# Patient Record
Sex: MALE | Race: WHITE
[De-identification: names, ages, dates, MRNs, and addresses within clinical notes are randomized per-mention and may not be internally consistent; named-entity substitution may affect disease eponyms.]

---

## 2020-02-14 ENCOUNTER — HOSPITAL ENCOUNTER (EMERGENCY)
Dept: HOSPITAL 25 - ED | Age: 20
Discharge: HOME | End: 2020-02-14
Payer: COMMERCIAL

## 2020-02-14 VITALS — DIASTOLIC BLOOD PRESSURE: 77 MMHG | SYSTOLIC BLOOD PRESSURE: 126 MMHG

## 2020-02-14 DIAGNOSIS — R51: Primary | ICD-10-CM

## 2020-02-14 DIAGNOSIS — Z72.0: ICD-10-CM

## 2020-02-14 PROCEDURE — 99282 EMERGENCY DEPT VISIT SF MDM: CPT

## 2020-02-14 PROCEDURE — 70450 CT HEAD/BRAIN W/O DYE: CPT

## 2020-02-14 NOTE — ED
Headache





- HPI Summary


HPI Summary: 


18-year-old male presents with headache today.  He states he was having sex and 

he developed a headache as he was climaxing.  States it was very intense.  Is 

located on the left side of head on the posterior aspect.  States had left side 

neck pain with it.  He admits to some nausea. Denies any change in vision.  no 

photophobia or phonophobia.  Does have a history of migraines.  This does not 

feel like his normal migraines.  He states he had this headache earlier this 

week while he was doing bicep curls.  States it lasted a couple minutes.  He 

went to the chiropractor and was told his neck muscles may be tight.  He states 

that he's never had this before. His headache started about an hour ago.  He 

states is 4 out of 10 intensity.  





- History Of Current Complaint


Chief Complaint: EDHeadache


Stated Complaint: BACK OF HEAD PAIN PER PT


Time Seen by Provider: 02/14/20 19:36





- Allergies/Home Medications


Allergies/Adverse Reactions: 


 Allergies











Allergy/AdvReac Type Severity Reaction Status Date / Time


 


No Known Allergies Allergy   Verified 02/14/20 19:03











Home Medications: 


 Home Medications





NK [No Home Medications Reported]  02/14/20 [History Confirmed 02/14/20]











PMH/Surg Hx/FS Hx/Imm Hx


Endocrine/Hematology History: 


   Denies: Hx Anticoagulant Therapy


Respiratory History: 


   Denies: Hx Asthma


Infectious Disease History: No


Infectious Disease History: 


   Denies: Traveled Outside the [place] in Last 30 Days





- Family History


Known Family History: Positive: Non-Contributory





- Social History


Alcohol Use: Occasionally


Substance Use Type: Reports: Marijuana


Smoking Status (MU): Current Some Day Smoker





Review of Systems


Negative: Fever


Negative: Chest Pain


Negative: Shortness Of Breath


Positive: Headache


All Other Systems Reviewed And Are Negative: Yes





Physical Exam


Triage Information Reviewed: Yes


Vital Signs On Initial Exam: 


 Initial Vitals











Temp Pulse Resp BP Pulse Ox


 


 98.1 F   74   16   152/80   99 


 


 02/14/20 18:58  02/14/20 18:58  02/14/20 18:58  02/14/20 18:58  02/14/20 18:58











Vital Signs Reviewed: Yes


Appearance: Positive: Well-Appearing


Skin: Positive: Warm, Dry


Head/Face: Positive: Normal Head/Face Inspection


Eyes: Positive: Normal, EOMI, GÓMEZ, Conjunctiva Clear


ENT: Positive: Normal ENT inspection, Pharynx normal, TMs normal


Neck: Positive: Other: - tenderness side of left neck, no midline tenderness


Respiratory/Lung Sounds: Positive: Clear to Auscultation, Breath Sounds Present


Cardiovascular: Positive: Normal, RRR


Musculoskeletal: Positive: Normal


Neurological: Positive: Sensory/Motor Intact, Alert, Oriented to Person Place, 

Time, CN Intact II-III


Psychiatric: Positive: Normal





- [place] Coma Scale


Best Eye Response: 4 - Spontaneous


Best Motor Response: 6 - Obeys Commands


Best Verbal Response: 5 - Oriented


Coma Scale Total: 15





Procedures





- Sedation


Patient Received Moderate/Deep Sedation with Procedure: No





Diagnostics





- Vital Signs


 Vital Signs











  Temp Pulse Resp BP Pulse Ox


 


 02/14/20 18:58  98.1 F  74  16  152/80  99














- Laboratory


Lab Statement: Any lab studies that have been ordered have been reviewed, and 

results considered in the medical decision making process.





- CT


  ** brain


CT Interpretation Completed By: Radiologist


Summary of CT Findings: IMPRESSION: No acute intracranial abnormality.





Headache Course/Dx





- Course


Course Of Treatment: 18-year-old male presents with headache today.  He states 

he was having sex and he developed a headache as he was climaxing.  States it 

was very intense.  Is located on the left side of head on the posterior aspect.

  States had left side neck pain with it.  He admits to some nausea. Denies any 

change in vision.  no photophobia or phonophobia.  Does have a history of 

migraines.  This does not feel like his normal migraines.  He states he had 

this headache earlier this week while he was doing bicep curls.  States it 

lasted a couple minutes.  He went to the chiropractor and was told his neck 

muscles may be tight.  He states that he's never had this before. His headache 

started about an hour ago.  He states is 4 out of 10 intensity.  On exam has 

normal neuro exam. tenderness over left side of neck. CT brain normal.  CT was 

within 6 hours so highly sensitive.  Told if symptoms worsen to return.  Told 

to follow up otherwise follow up with primary.  Patient understands and agrees 

the plan.





- Diagnoses


Differential Diagnosis/HQI/PQRI: Migraine, Subarachnoid Hemorrhage, Tension 

Headache


Provider Diagnoses: 


 Headache








Discharge ED





- Sign-Out/Discharge


Documenting (check all that apply): Patient Departure





- Discharge Plan


Condition: Good


Disposition: HOME


Patient Education Materials:  Acute Headache (ED)


Referrals: 


[facility] – [place], [place] PHYSICIAN REFERRAL [Outside]


Additional Instructions: 


take tyenlol or ibuprofen for pain every 6 hours


apply heat


Follow up with primary within 5 days


Return to ED if develop any new or worsening symptoms





- Billing Disposition and Condition


Condition: GOOD


Disposition: Home